# Patient Record
Sex: MALE | Race: WHITE | Employment: FULL TIME | URBAN - NONMETROPOLITAN AREA
[De-identification: names, ages, dates, MRNs, and addresses within clinical notes are randomized per-mention and may not be internally consistent; named-entity substitution may affect disease eponyms.]

---

## 2023-10-20 ENCOUNTER — TELEPHONE (OUTPATIENT)
Dept: SURGERY | Age: 48
End: 2023-10-20

## 2023-10-20 NOTE — TELEPHONE ENCOUNTER
[facility] clinic returned phone call to office. No office notes to dictate specific diagnosis. Patient sought clinic stating he believed he had a left inguinal hernia.

## 2023-10-23 RX ORDER — PANTOPRAZOLE SODIUM 40 MG/1
FOR SUSPENSION ORAL
COMMUNITY
Start: 2020-10-23

## 2023-10-24 ENCOUNTER — TELEPHONE (OUTPATIENT)
Dept: SURGERY | Age: 48
End: 2023-10-24

## 2023-10-24 ENCOUNTER — OFFICE VISIT (OUTPATIENT)
Dept: SURGERY | Age: 48
End: 2023-10-24
Payer: COMMERCIAL

## 2023-10-24 VITALS
OXYGEN SATURATION: 98 % | WEIGHT: 198.3 LBS | TEMPERATURE: 97.6 F | SYSTOLIC BLOOD PRESSURE: 142 MMHG | HEIGHT: 70 IN | DIASTOLIC BLOOD PRESSURE: 92 MMHG | HEART RATE: 74 BPM | RESPIRATION RATE: 18 BRPM | BODY MASS INDEX: 28.39 KG/M2

## 2023-10-24 DIAGNOSIS — K40.90 INGUINAL HERNIA, LEFT: Primary | ICD-10-CM

## 2023-10-24 PROCEDURE — 99204 OFFICE O/P NEW MOD 45 MIN: CPT | Performed by: SURGERY

## 2023-10-24 RX ORDER — VENLAFAXINE HYDROCHLORIDE 37.5 MG/1
37.5 CAPSULE, EXTENDED RELEASE ORAL DAILY
COMMUNITY
Start: 2023-10-16

## 2023-10-24 RX ORDER — PHENTERMINE HYDROCHLORIDE 37.5 MG/1
CAPSULE ORAL
COMMUNITY
Start: 2023-04-03

## 2023-10-24 RX ORDER — OMEGA-3-ACID ETHYL ESTERS 1 G/1
CAPSULE, LIQUID FILLED ORAL
COMMUNITY
Start: 2021-10-23

## 2023-10-24 NOTE — PROGRESS NOTES
Kiesha Hilliard. tSephy, [address]  [phone]  New Patient Evaluation in Office    Pt Name: Jenn Dozier  Date of Birth 1975   Today's Date: 10/24/2023  Medical Record Number: [id number]  Referring Provider: No ref. provider found  Primary Care Provider: Juan Pablo Crystal MD  Chief Complaint   Patient presents with    Surgical Consult     NP self refer-Left inguinal hernia     ASSESSMENT       Diagnosis Orders   1. Inguinal hernia, left  REPAIR HERNIA INGUINAL      History reviewed. No pertinent past medical history. PLANS      Schedule Miguel Amezcua for left inguinal hernia repair with mesh  I had a discussion with the patient regarding potential therapies for hernias and the risks of hernia surgery to include bleeding, infection, recurrence, injury to surrounding structures and organs, and continued pain in the area. We also discussed the use of mesh and its advantages and disadvantages. We discussed the anesthetic options, conduct of the operation, outpatient status, post op recovery and length of time off of work. After this discussion, the patient's questions were answered and has elected to proceed with surgical repair. Status: outpatient  Planned anesthesia: general  He will undergo pre-operative clearance per anesthesia guidelines with risk factors listed under the past medical history diagnosis & problem list.     Patricia Juares is a 53 y.o. male seen in the consultation for evaluation of a left inguinal hernia. Symptoms were first noted 1 year ago and has gradually worsened since that time. The pain is dull, intermittent, mild in severity. It is aggravated by Valsalva maneuvers and palliated by rest. He has no additional symptoms. He denies signs or symptoms of incarceration or strangulation. He has had prior right inguinal hernia surgery. This hernia is not stated to be work related per patient.    Past Medical

## 2023-10-24 NOTE — TELEPHONE ENCOUNTER
Pt calling in wanting to r/s his surgery to 12/18. Surgery rescheduled to 12/18, arrive at 6 am ASC.  Pt voiced understanding

## 2023-12-14 ENCOUNTER — PREP FOR PROCEDURE (OUTPATIENT)
Dept: SURGERY | Age: 48
End: 2023-12-14

## 2023-12-14 RX ORDER — SODIUM CHLORIDE 0.9 % (FLUSH) 0.9 %
5-40 SYRINGE (ML) INJECTION PRN
Status: CANCELLED | OUTPATIENT
Start: 2023-12-14

## 2023-12-14 RX ORDER — SODIUM CHLORIDE 9 MG/ML
INJECTION, SOLUTION INTRAVENOUS PRN
Status: CANCELLED | OUTPATIENT
Start: 2023-12-14

## 2023-12-14 RX ORDER — SODIUM CHLORIDE 0.9 % (FLUSH) 0.9 %
5-40 SYRINGE (ML) INJECTION EVERY 12 HOURS SCHEDULED
Status: CANCELLED | OUTPATIENT
Start: 2023-12-14

## 2023-12-14 RX ORDER — SODIUM CHLORIDE 9 MG/ML
INJECTION, SOLUTION INTRAVENOUS CONTINUOUS
Status: CANCELLED | OUTPATIENT
Start: 2023-12-14

## 2023-12-28 DIAGNOSIS — K40.90 INGUINAL HERNIA, LEFT: ICD-10-CM

## 2024-01-02 ENCOUNTER — OFFICE VISIT (OUTPATIENT)
Dept: SURGERY | Age: 49
End: 2024-01-02

## 2024-01-02 VITALS
WEIGHT: 199.1 LBS | BODY MASS INDEX: 28.5 KG/M2 | DIASTOLIC BLOOD PRESSURE: 82 MMHG | SYSTOLIC BLOOD PRESSURE: 128 MMHG | HEIGHT: 70 IN | TEMPERATURE: 97.2 F

## 2024-01-02 DIAGNOSIS — Z98.890 S/P LEFT INGUINAL HERNIORRHAPHY: Primary | ICD-10-CM

## 2024-01-02 DIAGNOSIS — Z87.19 S/P LEFT INGUINAL HERNIORRHAPHY: Primary | ICD-10-CM

## 2024-01-02 PROCEDURE — 99024 POSTOP FOLLOW-UP VISIT: CPT | Performed by: SURGERY

## 2024-01-02 NOTE — PATIENT INSTRUCTIONS
No lifting, pushing or pulling more than 30 lbs for 2 weeks, then 50 lbs for 2 weeks, then 80 lbs for 2 weeks. No restrictions after 6 weeks.

## 2024-01-02 NOTE — PROGRESS NOTES
[facility]  [address]  [place] [address]  [phone]  Post Procedure Evaluation in Office    Pt Name: Josh ePters  Date of Birth 1975   Today's Date: 1/2/2024  Medical Record Number: [id number]  Referring Provider: No ref. provider found  Primary Care Provider: Alfonso Chavarria MD  Chief Complaint   Patient presents with    Post-Op Check     S/p  left inguinal hernia repair with mesh 12/18/23     ASSESSMENT       Diagnosis Orders   1. S/P left inguinal herniorrhaphy      12/18/23      Incision is clean, dry and intact or healing as expected   PLANS      Pathology reviewed with the patient who understands. All questions were answered.  Patient Instructions   No lifting, pushing or pulling more than 30 lbs for 2 weeks, then 50 lbs for 2 weeks, then 80 lbs for 2 weeks. No restrictions after 6 weeks.   Follow up: Return for As needed. Instructed to call if any concerns.       SUBJECTIVE      Josh is seen today for post-op follow-up. He is S/P left inguinal hernia repair with mesh 12/18/23. He is tolerating a regular diet, having regular bowel movements. Symptoms and activity have gradually improved compared to preoperative. The surgical site is clean and has no drainage. Pain is controlled without any narcotic pain medications. He has compliant with postoperative instructions.  Past Medical History  No past medical history on file.  Past Surgical History  Past Surgical History:   Procedure Laterality Date    CARPAL TUNNEL RELEASE Bilateral 2022    ELBOW ARTHROCENTESIS  2023    GANGLION CYST EXCISION Left 2007    HERNIA REPAIR Left 12/18/2023    OPEN LEFT INGUINAL HERNIA REPAIR WITH MESH performed by Adam Keyes DO at [facility] [place] [facility]    INGUINAL HERNIA REPAIR Right 2012    VASECTOMY  2012     Medications  Current Outpatient Medications   Medication Sig Dispense Refill    phentermine (ADIPEX-P) 37.5 MG capsule